# Patient Record
Sex: MALE | Race: WHITE | NOT HISPANIC OR LATINO | Employment: FULL TIME | URBAN - METROPOLITAN AREA
[De-identification: names, ages, dates, MRNs, and addresses within clinical notes are randomized per-mention and may not be internally consistent; named-entity substitution may affect disease eponyms.]

---

## 2023-01-14 ENCOUNTER — HOSPITAL ENCOUNTER (OUTPATIENT)
Facility: MEDICAL CENTER | Age: 38
End: 2023-01-15
Attending: EMERGENCY MEDICINE | Admitting: INTERNAL MEDICINE
Payer: OTHER MISCELLANEOUS

## 2023-01-14 ENCOUNTER — APPOINTMENT (OUTPATIENT)
Dept: RADIOLOGY | Facility: MEDICAL CENTER | Age: 38
End: 2023-01-14
Attending: EMERGENCY MEDICINE
Payer: OTHER MISCELLANEOUS

## 2023-01-14 DIAGNOSIS — R00.0 WIDE-COMPLEX TACHYCARDIA: ICD-10-CM

## 2023-01-14 DIAGNOSIS — I45.6 WPW (WOLFF-PARKINSON-WHITE SYNDROME): ICD-10-CM

## 2023-01-14 LAB
ALBUMIN SERPL BCP-MCNC: 3.8 G/DL (ref 3.2–4.9)
ALBUMIN/GLOB SERPL: 1.5 G/DL
ALP SERPL-CCNC: 44 U/L (ref 30–99)
ALT SERPL-CCNC: 17 U/L (ref 2–50)
AMPHET UR QL SCN: NEGATIVE
ANION GAP SERPL CALC-SCNC: 10 MMOL/L (ref 7–16)
AST SERPL-CCNC: 27 U/L (ref 12–45)
BARBITURATES UR QL SCN: NEGATIVE
BASOPHILS # BLD AUTO: 0.5 % (ref 0–1.8)
BASOPHILS # BLD: 0.05 K/UL (ref 0–0.12)
BENZODIAZ UR QL SCN: NEGATIVE
BILIRUB SERPL-MCNC: 0.2 MG/DL (ref 0.1–1.5)
BUN SERPL-MCNC: 17 MG/DL (ref 8–22)
BZE UR QL SCN: NEGATIVE
CALCIUM ALBUM COR SERPL-MCNC: 8.7 MG/DL (ref 8.5–10.5)
CALCIUM SERPL-MCNC: 8.5 MG/DL (ref 8.5–10.5)
CANNABINOIDS UR QL SCN: NEGATIVE
CHLORIDE SERPL-SCNC: 105 MMOL/L (ref 96–112)
CO2 SERPL-SCNC: 23 MMOL/L (ref 20–33)
CREAT SERPL-MCNC: 0.91 MG/DL (ref 0.5–1.4)
EKG IMPRESSION: NORMAL
EOSINOPHIL # BLD AUTO: 0.22 K/UL (ref 0–0.51)
EOSINOPHIL NFR BLD: 2.2 % (ref 0–6.9)
ERYTHROCYTE [DISTWIDTH] IN BLOOD BY AUTOMATED COUNT: 42.8 FL (ref 35.9–50)
ETHANOL BLD-MCNC: <10.1 MG/DL
GFR SERPLBLD CREATININE-BSD FMLA CKD-EPI: 112 ML/MIN/1.73 M 2
GLOBULIN SER CALC-MCNC: 2.5 G/DL (ref 1.9–3.5)
GLUCOSE SERPL-MCNC: 79 MG/DL (ref 65–99)
HCT VFR BLD AUTO: 45.5 % (ref 42–52)
HGB BLD-MCNC: 15.3 G/DL (ref 14–18)
IMM GRANULOCYTES # BLD AUTO: 0.03 K/UL (ref 0–0.11)
IMM GRANULOCYTES NFR BLD AUTO: 0.3 % (ref 0–0.9)
LYMPHOCYTES # BLD AUTO: 2.98 K/UL (ref 1–4.8)
LYMPHOCYTES NFR BLD: 29.5 % (ref 22–41)
MAGNESIUM SERPL-MCNC: 1.7 MG/DL (ref 1.5–2.5)
MCH RBC QN AUTO: 29.7 PG (ref 27–33)
MCHC RBC AUTO-ENTMCNC: 33.6 G/DL (ref 33.7–35.3)
MCV RBC AUTO: 88.2 FL (ref 81.4–97.8)
METHADONE UR QL SCN: NEGATIVE
MONOCYTES # BLD AUTO: 0.6 K/UL (ref 0–0.85)
MONOCYTES NFR BLD AUTO: 5.9 % (ref 0–13.4)
NEUTROPHILS # BLD AUTO: 6.21 K/UL (ref 1.82–7.42)
NEUTROPHILS NFR BLD: 61.6 % (ref 44–72)
NRBC # BLD AUTO: 0 K/UL
NRBC BLD-RTO: 0 /100 WBC
OPIATES UR QL SCN: NEGATIVE
OXYCODONE UR QL SCN: NEGATIVE
PCP UR QL SCN: NEGATIVE
PLATELET # BLD AUTO: 258 K/UL (ref 164–446)
PMV BLD AUTO: 10.7 FL (ref 9–12.9)
POTASSIUM SERPL-SCNC: 4.2 MMOL/L (ref 3.6–5.5)
PROPOXYPH UR QL SCN: NEGATIVE
PROT SERPL-MCNC: 6.3 G/DL (ref 6–8.2)
RBC # BLD AUTO: 5.16 M/UL (ref 4.7–6.1)
SODIUM SERPL-SCNC: 138 MMOL/L (ref 135–145)
TROPONIN T SERPL-MCNC: 6 NG/L (ref 6–19)
TSH SERPL DL<=0.005 MIU/L-ACNC: 2.5 UIU/ML (ref 0.38–5.33)
WBC # BLD AUTO: 10.1 K/UL (ref 4.8–10.8)

## 2023-01-14 PROCEDURE — 82077 ASSAY SPEC XCP UR&BREATH IA: CPT

## 2023-01-14 PROCEDURE — 96374 THER/PROPH/DIAG INJ IV PUSH: CPT

## 2023-01-14 PROCEDURE — 700111 HCHG RX REV CODE 636 W/ 250 OVERRIDE (IP)

## 2023-01-14 PROCEDURE — 92960 CARDIOVERSION ELECTRIC EXT: CPT

## 2023-01-14 PROCEDURE — G0378 HOSPITAL OBSERVATION PER HR: HCPCS

## 2023-01-14 PROCEDURE — 71045 X-RAY EXAM CHEST 1 VIEW: CPT

## 2023-01-14 PROCEDURE — 84443 ASSAY THYROID STIM HORMONE: CPT

## 2023-01-14 PROCEDURE — 83735 ASSAY OF MAGNESIUM: CPT

## 2023-01-14 PROCEDURE — 85025 COMPLETE CBC W/AUTO DIFF WBC: CPT

## 2023-01-14 PROCEDURE — 700105 HCHG RX REV CODE 258: Performed by: INTERNAL MEDICINE

## 2023-01-14 PROCEDURE — 80307 DRUG TEST PRSMV CHEM ANLYZR: CPT

## 2023-01-14 PROCEDURE — 36415 COLL VENOUS BLD VENIPUNCTURE: CPT

## 2023-01-14 PROCEDURE — 94760 N-INVAS EAR/PLS OXIMETRY 1: CPT

## 2023-01-14 PROCEDURE — 94799 UNLISTED PULMONARY SVC/PX: CPT

## 2023-01-14 PROCEDURE — 96375 TX/PRO/DX INJ NEW DRUG ADDON: CPT

## 2023-01-14 PROCEDURE — 99222 1ST HOSP IP/OBS MODERATE 55: CPT | Performed by: INTERNAL MEDICINE

## 2023-01-14 PROCEDURE — 93005 ELECTROCARDIOGRAM TRACING: CPT | Performed by: EMERGENCY MEDICINE

## 2023-01-14 PROCEDURE — 80053 COMPREHEN METABOLIC PANEL: CPT

## 2023-01-14 PROCEDURE — 99285 EMERGENCY DEPT VISIT HI MDM: CPT

## 2023-01-14 PROCEDURE — 84484 ASSAY OF TROPONIN QUANT: CPT

## 2023-01-14 RX ORDER — ADENOSINE 3 MG/ML
12 INJECTION, SOLUTION INTRAVENOUS ONCE
Status: COMPLETED | OUTPATIENT
Start: 2023-01-14 | End: 2023-01-14

## 2023-01-14 RX ORDER — POLYETHYLENE GLYCOL 3350 17 G/17G
1 POWDER, FOR SOLUTION ORAL
Status: DISCONTINUED | OUTPATIENT
Start: 2023-01-14 | End: 2023-01-15 | Stop reason: HOSPADM

## 2023-01-14 RX ORDER — PROPOFOL 10 MG/ML
100 INJECTION, EMULSION INTRAVENOUS ONCE
Status: COMPLETED | OUTPATIENT
Start: 2023-01-14 | End: 2023-01-14

## 2023-01-14 RX ORDER — ADENOSINE 3 MG/ML
INJECTION, SOLUTION INTRAVENOUS
Status: COMPLETED
Start: 2023-01-14 | End: 2023-01-14

## 2023-01-14 RX ORDER — SODIUM CHLORIDE, SODIUM LACTATE, POTASSIUM CHLORIDE, CALCIUM CHLORIDE 600; 310; 30; 20 MG/100ML; MG/100ML; MG/100ML; MG/100ML
INJECTION, SOLUTION INTRAVENOUS CONTINUOUS
Status: DISCONTINUED | OUTPATIENT
Start: 2023-01-14 | End: 2023-01-15 | Stop reason: HOSPADM

## 2023-01-14 RX ORDER — PROPOFOL 10 MG/ML
INJECTION, EMULSION INTRAVENOUS
Status: COMPLETED
Start: 2023-01-14 | End: 2023-01-14

## 2023-01-14 RX ORDER — ENOXAPARIN SODIUM 100 MG/ML
40 INJECTION SUBCUTANEOUS DAILY
Status: DISCONTINUED | OUTPATIENT
Start: 2023-01-15 | End: 2023-01-15 | Stop reason: HOSPADM

## 2023-01-14 RX ORDER — BISACODYL 10 MG
10 SUPPOSITORY, RECTAL RECTAL
Status: DISCONTINUED | OUTPATIENT
Start: 2023-01-14 | End: 2023-01-15 | Stop reason: HOSPADM

## 2023-01-14 RX ORDER — AMOXICILLIN 250 MG
2 CAPSULE ORAL 2 TIMES DAILY
Status: DISCONTINUED | OUTPATIENT
Start: 2023-01-15 | End: 2023-01-15 | Stop reason: HOSPADM

## 2023-01-14 RX ADMIN — PROPOFOL 100 MG: 10 INJECTION, EMULSION INTRAVENOUS at 19:26

## 2023-01-14 RX ADMIN — ADENOSINE 12 MG: 3 INJECTION INTRAVENOUS at 19:10

## 2023-01-14 RX ADMIN — ADENOSINE 12 MG: 3 INJECTION, SOLUTION INTRAVENOUS at 19:10

## 2023-01-14 RX ADMIN — SODIUM CHLORIDE, POTASSIUM CHLORIDE, SODIUM LACTATE AND CALCIUM CHLORIDE: 600; 310; 30; 20 INJECTION, SOLUTION INTRAVENOUS at 20:23

## 2023-01-14 ASSESSMENT — ENCOUNTER SYMPTOMS
DOUBLE VISION: 0
ORTHOPNEA: 0
FEVER: 0
CHILLS: 0
COUGH: 0
CLAUDICATION: 0
PALPITATIONS: 1
BACK PAIN: 0
MYALGIAS: 0
VOMITING: 0
NAUSEA: 0
BLURRED VISION: 0
SHORTNESS OF BREATH: 0
ABDOMINAL PAIN: 0

## 2023-01-14 ASSESSMENT — FIBROSIS 4 INDEX: FIB4 SCORE: 0.89

## 2023-01-14 ASSESSMENT — LIFESTYLE VARIABLES
TOTAL SCORE: 0
ALCOHOL_USE: NO
TOTAL SCORE: 0
TOTAL SCORE: 0
ON A TYPICAL DAY WHEN YOU DRINK ALCOHOL HOW MANY DRINKS DO YOU HAVE: 0
AVERAGE NUMBER OF DAYS PER WEEK YOU HAVE A DRINK CONTAINING ALCOHOL: 0
EVER HAD A DRINK FIRST THING IN THE MORNING TO STEADY YOUR NERVES TO GET RID OF A HANGOVER: NO
CONSUMPTION TOTAL: NEGATIVE
EVER FELT BAD OR GUILTY ABOUT YOUR DRINKING: NO
HAVE PEOPLE ANNOYED YOU BY CRITICIZING YOUR DRINKING: NO
HOW MANY TIMES IN THE PAST YEAR HAVE YOU HAD 5 OR MORE DRINKS IN A DAY: 0
HAVE YOU EVER FELT YOU SHOULD CUT DOWN ON YOUR DRINKING: NO

## 2023-01-15 ENCOUNTER — PHARMACY VISIT (OUTPATIENT)
Dept: PHARMACY | Facility: MEDICAL CENTER | Age: 38
End: 2023-01-15
Payer: COMMERCIAL

## 2023-01-15 ENCOUNTER — APPOINTMENT (OUTPATIENT)
Dept: CARDIOLOGY | Facility: MEDICAL CENTER | Age: 38
End: 2023-01-15
Attending: INTERNAL MEDICINE
Payer: OTHER MISCELLANEOUS

## 2023-01-15 VITALS
RESPIRATION RATE: 14 BRPM | DIASTOLIC BLOOD PRESSURE: 63 MMHG | SYSTOLIC BLOOD PRESSURE: 118 MMHG | BODY MASS INDEX: 24.55 KG/M2 | HEIGHT: 72 IN | TEMPERATURE: 98.4 F | OXYGEN SATURATION: 93 % | WEIGHT: 181.22 LBS | HEART RATE: 58 BPM

## 2023-01-15 PROBLEM — I48.91 ATRIAL FIBRILLATION (HCC): Status: ACTIVE | Noted: 2023-01-15

## 2023-01-15 LAB
ALBUMIN SERPL BCP-MCNC: 3.8 G/DL (ref 3.2–4.9)
ALBUMIN/GLOB SERPL: 1.9 G/DL
ALP SERPL-CCNC: 46 U/L (ref 30–99)
ALT SERPL-CCNC: 13 U/L (ref 2–50)
ANION GAP SERPL CALC-SCNC: 8 MMOL/L (ref 7–16)
AST SERPL-CCNC: 16 U/L (ref 12–45)
BASOPHILS # BLD AUTO: 0.6 % (ref 0–1.8)
BASOPHILS # BLD: 0.05 K/UL (ref 0–0.12)
BILIRUB SERPL-MCNC: 0.2 MG/DL (ref 0.1–1.5)
BUN SERPL-MCNC: 17 MG/DL (ref 8–22)
CALCIUM ALBUM COR SERPL-MCNC: 9 MG/DL (ref 8.5–10.5)
CALCIUM SERPL-MCNC: 8.8 MG/DL (ref 8.5–10.5)
CHLORIDE SERPL-SCNC: 108 MMOL/L (ref 96–112)
CO2 SERPL-SCNC: 25 MMOL/L (ref 20–33)
CREAT SERPL-MCNC: 0.86 MG/DL (ref 0.5–1.4)
EKG IMPRESSION: NORMAL
EKG IMPRESSION: NORMAL
EOSINOPHIL # BLD AUTO: 0.18 K/UL (ref 0–0.51)
EOSINOPHIL NFR BLD: 2.2 % (ref 0–6.9)
ERYTHROCYTE [DISTWIDTH] IN BLOOD BY AUTOMATED COUNT: 42.9 FL (ref 35.9–50)
GFR SERPLBLD CREATININE-BSD FMLA CKD-EPI: 116 ML/MIN/1.73 M 2
GLOBULIN SER CALC-MCNC: 2 G/DL (ref 1.9–3.5)
GLUCOSE SERPL-MCNC: 127 MG/DL (ref 65–99)
HCT VFR BLD AUTO: 45.7 % (ref 42–52)
HGB BLD-MCNC: 15.6 G/DL (ref 14–18)
IMM GRANULOCYTES # BLD AUTO: 0.05 K/UL (ref 0–0.11)
IMM GRANULOCYTES NFR BLD AUTO: 0.6 % (ref 0–0.9)
LV EJECT FRACT  99904: 50
LYMPHOCYTES # BLD AUTO: 2.46 K/UL (ref 1–4.8)
LYMPHOCYTES NFR BLD: 30.1 % (ref 22–41)
MCH RBC QN AUTO: 29.9 PG (ref 27–33)
MCHC RBC AUTO-ENTMCNC: 34.1 G/DL (ref 33.7–35.3)
MCV RBC AUTO: 87.7 FL (ref 81.4–97.8)
MONOCYTES # BLD AUTO: 0.44 K/UL (ref 0–0.85)
MONOCYTES NFR BLD AUTO: 5.4 % (ref 0–13.4)
NEUTROPHILS # BLD AUTO: 4.98 K/UL (ref 1.82–7.42)
NEUTROPHILS NFR BLD: 61.1 % (ref 44–72)
NRBC # BLD AUTO: 0 K/UL
NRBC BLD-RTO: 0 /100 WBC
PLATELET # BLD AUTO: 267 K/UL (ref 164–446)
PMV BLD AUTO: 10.5 FL (ref 9–12.9)
POTASSIUM SERPL-SCNC: 3.8 MMOL/L (ref 3.6–5.5)
PROT SERPL-MCNC: 5.8 G/DL (ref 6–8.2)
RBC # BLD AUTO: 5.21 M/UL (ref 4.7–6.1)
SODIUM SERPL-SCNC: 141 MMOL/L (ref 135–145)
WBC # BLD AUTO: 8.2 K/UL (ref 4.8–10.8)

## 2023-01-15 PROCEDURE — 93010 ELECTROCARDIOGRAM REPORT: CPT | Performed by: INTERNAL MEDICINE

## 2023-01-15 PROCEDURE — 99204 OFFICE O/P NEW MOD 45 MIN: CPT | Performed by: INTERNAL MEDICINE

## 2023-01-15 PROCEDURE — G0378 HOSPITAL OBSERVATION PER HR: HCPCS

## 2023-01-15 PROCEDURE — 93005 ELECTROCARDIOGRAM TRACING: CPT | Performed by: INTERNAL MEDICINE

## 2023-01-15 PROCEDURE — 99239 HOSP IP/OBS DSCHRG MGMT >30: CPT | Performed by: INTERNAL MEDICINE

## 2023-01-15 PROCEDURE — 93306 TTE W/DOPPLER COMPLETE: CPT | Mod: 26 | Performed by: INTERNAL MEDICINE

## 2023-01-15 PROCEDURE — 85025 COMPLETE CBC W/AUTO DIFF WBC: CPT

## 2023-01-15 PROCEDURE — 80053 COMPREHEN METABOLIC PANEL: CPT

## 2023-01-15 PROCEDURE — RXMED WILLOW AMBULATORY MEDICATION CHARGE: Performed by: NURSE PRACTITIONER

## 2023-01-15 PROCEDURE — 93306 TTE W/DOPPLER COMPLETE: CPT

## 2023-01-15 RX ORDER — FLECAINIDE ACETATE 50 MG/1
50 TABLET ORAL 2 TIMES DAILY
Qty: 60 TABLET | Refills: 2 | Status: SHIPPED | OUTPATIENT
Start: 2023-01-15

## 2023-01-15 RX ORDER — METOPROLOL SUCCINATE 25 MG/1
25 TABLET, EXTENDED RELEASE ORAL DAILY
Qty: 30 TABLET | Refills: 2 | Status: SHIPPED | OUTPATIENT
Start: 2023-01-15

## 2023-01-15 ASSESSMENT — PATIENT HEALTH QUESTIONNAIRE - PHQ9
2. FEELING DOWN, DEPRESSED, IRRITABLE, OR HOPELESS: NOT AT ALL
SUM OF ALL RESPONSES TO PHQ9 QUESTIONS 1 AND 2: 0
1. LITTLE INTEREST OR PLEASURE IN DOING THINGS: NOT AT ALL

## 2023-01-15 ASSESSMENT — ENCOUNTER SYMPTOMS
EYE DISCHARGE: 0
FEVER: 0
MYALGIAS: 0
NAUSEA: 0
WHEEZING: 0
EYE PAIN: 0
VOMITING: 0
PALPITATIONS: 1
CHILLS: 0
NERVOUS/ANXIOUS: 0
BRUISES/BLEEDS EASILY: 0
COUGH: 0
ABDOMINAL PAIN: 0

## 2023-01-15 NOTE — ED NOTES
Med rec updated and complete. Allergies reviewed. Confirmed name and date of birth.  Birth date 1985 not 1985. Pt  Denies taking medications. Pt will need to use [facility] ( meds to beds) .  Pt is not from the area.

## 2023-01-15 NOTE — DISCHARGE INSTRUCTIONS
Discharge Instructions    Discharged to home by car with relative. Discharged via wheelchair, hospital escort: Yes.  Special equipment needed: Not Applicable    Be sure to schedule a follow-up appointment with your primary care doctor or any specialists as instructed.     Discharge Plan:   Diet Plan: Discussed  Activity Level: Discussed  Confirmed Follow up Appointment: Patient to Call and Schedule Appointment  Confirmed Symptoms Management: Discussed  Medication Reconciliation Updated: Yes  Influenza Vaccine Indication: Patient Refuses    I understand that a diet low in cholesterol, fat, and sodium is recommended for good health. Unless I have been given specific instructions below for another diet, I accept this instruction as my diet prescription.   Other diet: Heart Healthy     Special Instructions: None    -Is this patient being discharged with medication to prevent blood clots?  No    Is patient discharged on Warfarin / Coumadin?   No

## 2023-01-15 NOTE — PROGRESS NOTES
Report received from MYA Wilson.  Assumed care of patient.  Assessment complete.  Plan of care gone over with the patient and all concerns addressed.  Patient sitting up in bed with his significant other at the bedside. .  Patient A & O x 4.  No apparent signs of distress.  Safety precautions in place.  Patient educated to call for assistance.  Hourly rounding in place.

## 2023-01-15 NOTE — ASSESSMENT & PLAN NOTE
Patient presented with wide complex tachycardia and received  adenosine in ER twice but no change so he was cardiverted in ER and is now on sinus rhythm  Cardiology Dr. Tillman has been consulted appreciate rec  Echo has been ordered   Monitor on tele

## 2023-01-15 NOTE — ED TRIAGE NOTES
Chief Complaint   Patient presents with    Tachycardia     PT reports he felt acutely light headed and felt his heart racing around 1830 today. Per EMS HR found to be elevates in 190s. PT has had SVT in past, no further medical follow up.        BIB EMS to [facility], pt on monitor, provided call bell and in gown, labs drawn and sent.    Medications given en route:    Adenosine 6mg and 12 mg, total of 18mg    BP (!) 144/87   Pulse (!) 212   Resp (!) 27   Ht 1.829 m (6')   Wt 76.2 kg (168 lb)   SpO2 97%   BMI 22.78 kg/m²

## 2023-01-15 NOTE — PROGRESS NOTES
Received pt from ED,on arrival pt awake,a&ox4,no c/o palpitation,monitor tech called and gave information,pt ambulatory,wife at bedside,tele in ED shows SR with HR89/mt,poc explained.

## 2023-01-15 NOTE — ED PROVIDER NOTES
"ED Provider Note    CHIEF COMPLAINT  Chief Complaint   Patient presents with    Tachycardia     PT reports he felt acutely light headed and felt his heart racing around 1830 today. Per EMS HR found to be elevates in 190s. PT has had SVT in past, no further medical follow up.         HPI    Primary care provider: No primary care provider on file.   History obtained from: Patient and EMS  History limited by: None     Anupam Portillo is a 37 y.o. male who presents to the ED by EMS and was seen on arrival due to his presentation.  Patient is visiting from [place] and had sudden onset of what he described as \"irregular and fast\" heartbeat about 30 minutes prior to arrival while he was resting in bed.  He reports feeling slightly lightheaded without syncope and also slightly short of breath.  He denies any significant pain.  He was noted to have heart rate up to 190-200 range by EMS and was given 6 mg followed by 12 mg of adenosine without change.  Patient denies recent fever.  He did have some sinus congestion around Katie time but no further congestion.  No cough/sore throat/nausea/vomiting/diarrhea/dysuria/rash/edema.  He reports that he has had similar episodes over the past few years approximately once per year lasting perhaps up to 2 minutes and resolves on its own.  He states that he has gone to the hospital afterwards but has always been found to be in normal sinus rhythm.  He does not believe he has been evaluated by cardiologist or has had a cardiac echo.  He states that he does not have any past medical problems and is not on any medications.  He did have some alcohol last night and reports that he drinks alcohol socially on the weekends but not heavily.  He denies drug use.    REVIEW OF SYSTEMS  Please see HPI for pertinent positives/negatives.  All other systems reviewed and are negative.     PAST MEDICAL HISTORY  No past medical history on file.     SURGICAL HISTORY  History reviewed. No pertinent surgical " history.     SOCIAL HISTORY  Social History     Tobacco Use    Smoking status: Never    Smokeless tobacco: Never   Vaping Use    Vaping Use: Never used   Substance and Sexual Activity    Alcohol use: Yes    Drug use: Never    Sexual activity: Not on file        FAMILY HISTORY  History reviewed. No pertinent family history.     CURRENT MEDICATIONS  Home Medications       Reviewed by Gretta Diaz (Pharmacy Tech) on 01/14/23 at 2000  Med List Status: Complete     Medication Last Dose Status        Patient Dre Taking any Medications                            ALLERGIES  No Known Allergies     PHYSICAL EXAM  VITAL SIGNS: /73   Pulse 85   Resp 17   Ht 1.829 m (6')   Wt 76.2 kg (168 lb)   SpO2 98%   BMI 22.78 kg/m²  @[id number]::@     Pulse ox interpretation: 97% I interpret this pulse ox as normal     Cardiac monitor interpretation: Wide-complex with heart rate in the 190s as interpreted by me.  The patient presented with palpitations and cardiac monitor was ordered to monitor for dysrhythmia.    Constitutional: Well developed, well nourished, alert in no apparent distress, nontoxic appearance    HENT: No external signs of trauma, normocephalic, oropharynx moist and clear, nose normal    Eyes: PERRL, conjunctiva without erythema, no discharge, no icterus    Neck: Soft and supple, trachea midline, no stridor, no tenderness, no LAD, no JVD, good ROM    Cardiovascular: Tachycardic and fairly regular, no murmurs/rubs/gallops, strong distal pulses and good perfusion    Thorax & Lungs: No respiratory distress, CTAB   Abdomen: Soft, nontender, nondistended, no guarding, no rebound, normal BS    Back: No CVAT    Extremities: No cyanosis, no edema, no gross deformity, good ROM, no tenderness, intact distal pulses with brisk cap refill    Skin: Warm, dry, no pallor/cyanosis, no rash noted    Lymphatic: No lymphadenopathy noted    Neuro: A/O times 3, no focal deficits noted    Psychiatric: Cooperative, normal mood  and affect, normal judgement, appropriate for clinical situation        DIAGNOSTIC STUDIES / PROCEDURES    Conscious Sedation Procedure    Indication: cardioversion    Consent: I have discussed with the patient and/or the patient representative the indication, alternatives, and the possible risks and/or complications of the planned procedure and the anesthesia methods. The patient and/or patient representative appear to understand and agree to proceed.    Physician Involvement: The attending physician was present and supervising this procedure.    Pre-Sedation Documentation and Exam: I have personally completed a history, physical exam & review of systems for this patient (see notes).  Airway Assessment: Mallampati Class II - (soft palate, fauces & uvula are visible)    Prior History of Anesthesia Complications: none    ASA Classification: Class 1 - A normal healthy patient    Sedation/ Anesthesia Plan: intravenous sedation    Medications Used: propofol intravenously    Monitoring and Safety: The patient was placed on a cardiac monitor and vital signs, pulse oximetry and level of consciousness were continuously evaluated throughout the procedure. The patient was closely monitored until recovery from the medications was complete and the patient had returned to baseline status. Respiratory therapy was on standby at all times during the procedure.    Post-Sedation Vital Signs: Vital signs were reviewed and were stable after the procedure (see flow sheet for vitals)            Post-Sedation Exam: Return to baseline           Complications: none      Cardioversion Procedure    Indication: Wide-complex tachycardia    Consent: The patient was counseled regarding the procedure, its indications, risks, potential complications and alternatives, and any questions were answered. Consent was obtained to proceed.    Pre-Medication: propofol 100 mg intravenously    Procedure: The patient was placed in the supine position and the  chest area was exposed. The cardioversion pads were applied in the standard manner and configuration.    Attempt #1: The defibrillator was set on the synchronous mode and charged to 200 joules.  A charge was then delivered which resulted in conversion to normal sinus rhythm.    The patient tolerated the procedure well.    Complications: None      EKG  12 Lead EKG obtained at 1906 and interpreted by me:   Rate: 187   Rhythm: Wide-complex tachycardia  Ectopy: PVCs and PACs    Clinical Impression: Patient appears to be in wide-complex tachycardia with PVCs and PACs    EKG  12 Lead EKG obtained at 1926 after cardioversion and interpreted by me:   Rate: 94   Rhythm: Sinus rhythm   Ectopy: None  Intervals: Normal   Axis: Normal   QRS: Normal  ST segments: Minimal ST depression precordial leads  T Waves: Normal    Clinical Impression: Sinus rhythm with nonspecific ST changes      LABS  All labs reviewed by me.     Results for orders placed or performed during the hospital encounter of 01/14/23   CBC WITH DIFFERENTIAL   Result Value Ref Range    WBC 10.1 4.8 - 10.8 K/uL    RBC 5.16 4.70 - 6.10 M/uL    Hemoglobin 15.3 14.0 - 18.0 g/dL    Hematocrit 45.5 42.0 - 52.0 %    MCV 88.2 81.4 - 97.8 fL    MCH 29.7 27.0 - 33.0 pg    MCHC 33.6 (L) 33.7 - 35.3 g/dL    RDW 42.8 35.9 - 50.0 fL    Platelet Count 258 164 - 446 K/uL    MPV 10.7 9.0 - 12.9 fL    Neutrophils-Polys 61.60 44.00 - 72.00 %    Lymphocytes 29.50 22.00 - 41.00 %    Monocytes 5.90 0.00 - 13.40 %    Eosinophils 2.20 0.00 - 6.90 %    Basophils 0.50 0.00 - 1.80 %    Immature Granulocytes 0.30 0.00 - 0.90 %    Nucleated RBC 0.00 /100 WBC    Neutrophils (Absolute) 6.21 1.82 - 7.42 K/uL    Lymphs (Absolute) 2.98 1.00 - 4.80 K/uL    Monos (Absolute) 0.60 0.00 - 0.85 K/uL    Eos (Absolute) 0.22 0.00 - 0.51 K/uL    Baso (Absolute) 0.05 0.00 - 0.12 K/uL    Immature Granulocytes (abs) 0.03 0.00 - 0.11 K/uL    NRBC (Absolute) 0.00 K/uL   COMP METABOLIC PANEL   Result Value Ref  Range    Sodium 138 135 - 145 mmol/L    Potassium 4.2 3.6 - 5.5 mmol/L    Chloride 105 96 - 112 mmol/L    Co2 23 20 - 33 mmol/L    Anion Gap 10.0 7.0 - 16.0    Glucose 79 65 - 99 mg/dL    Bun 17 8 - 22 mg/dL    Creatinine 0.91 0.50 - 1.40 mg/dL    Calcium 8.5 8.5 - 10.5 mg/dL    AST(SGOT) 27 12 - 45 U/L    ALT(SGPT) 17 2 - 50 U/L    Alkaline Phosphatase 44 30 - 99 U/L    Total Bilirubin 0.2 0.1 - 1.5 mg/dL    Albumin 3.8 3.2 - 4.9 g/dL    Total Protein 6.3 6.0 - 8.2 g/dL    Globulin 2.5 1.9 - 3.5 g/dL    A-G Ratio 1.5 g/dL   TROPONIN   Result Value Ref Range    Troponin T 6 6 - 19 ng/L   MAGNESIUM   Result Value Ref Range    Magnesium 1.7 1.5 - 2.5 mg/dL   TSH WITH REFLEX TO FT4   Result Value Ref Range    TSH 2.500 0.380 - 5.330 uIU/mL   DIAGNOSTIC ALCOHOL   Result Value Ref Range    Diagnostic Alcohol <10.1 <10.1 mg/dL   CORRECTED CALCIUM   Result Value Ref Range    Correct Calcium 8.7 8.5 - 10.5 mg/dL   ESTIMATED GFR   Result Value Ref Range    GFR (CKD-EPI) 112 >60 mL/min/1.73 m 2   EKG   Result Value Ref Range    Report       [facility]    Test Date:  2023  Pt Name:    RICHIE MONZON               Department: ER  MRN:        [id number]                      Room:        14  Gender:     Male                         Technician:  :        1985                   Requested By:ER TRIAGE PROTOCOL  Order #:    [id number]                    Reading MD:    Measurements  Intervals                                Axis  Rate:       187                          P:          -53  AK:         128                          QRS:        116  QRSD:       178                          T:          -53  QT:         316  QTc:        558    Interpretive Statements  Extreme tachycardia with wide complex, no further rhythm analysis attempted  No previous ECG available for comparison     EKG   Result Value Ref Range    Report       [facility]    Test Date:   2023  Pt Name:    RICHIE MONZON               Department: ER  MRN:        [id number]                      Room:        14  Gender:     Male                         Technician:  :        1985                   Requested By:JOVANNY NEAL  Order #:    [id number]                    Reading MD:    Measurements  Intervals                                Axis  Rate:       94                           P:          50  KY:         127                          QRS:        128  QRSD:       114                          T:          57  QT:         341  QTc:        427    Interpretive Statements  Sinus rhythm  Borderline intraventricular conduction delay  Probable right ventricular hypertrophy  Compared to ECG 2023 19:06:48  No significant changes          RADIOLOGY  I have independently interpreted the diagnostic imaging associated with this visit and am waiting the final reading from the radiologist.     DX-CHEST-PORTABLE (1 VIEW)   Final Result      No acute cardiopulmonary disease evident.      EC-ECHOCARDIOGRAM COMPLETE W/O CONT    (Results Pending)          COURSE & MEDICAL DECISION MAKING  Nursing notes, VS, PMSFHx reviewed in chart.     Review of past medical records shows the patient without prior visits to this ED.      Differential diagnoses considered include but are not limited to: Atrial fib/flutter, SVT, ventricular tachycardia, WPW, Brugada sydrome, prolonged QT syndrome, PAC, PVC, AMI, CHF, valvular heart disease, thyroid disorder, electrolyte abnormality       ED Observation Status? Yes; I am placing the patient in to an observation status due to a diagnostic uncertainty as well as therapeutic intensity. Patient placed in observation status at 7:19 PM, 2023.       INITIAL ASSESSMENT AND PLAN  Care Narrative: This is a 37-year-old otherwise healthy patient who presents by EMS due to sudden onset of fast irregular heartbeat approximately 30 minutes prior to arrival while at rest.  He is noted  to have wide-complex tachycardia but otherwise exam is benign.  No hypotension.  Patient is alert.  12 mg adenosine given in the ED without any change.  Discussed with patient cardioversion under procedural sedation and he is agreeable.      Discussion of management with other [facility] or appropriate source(s): Pharmacy regarding medication treatment and cardiologist        1955: D/W Dr. Tillman, cardiologist.  Patient can be admitted by hospitalist to be monitored overnight and to obtain echo.  They will consult on the patient    2005: D/W hospitalist who will admit patient      History and physical exam as above.  I was called to see the patient upon his arrival due to his tachycardia.  His heart rate was noted to be around 190-200 range and wide-complex.  He was not hypotensive or hypoxic and without mental status change or respiratory distress.  There was no change with 12 mg of adenosine in the ED.  I discussed with him risks/benefits/indications for cardioversion under sedation and he would like to proceed.  He converted to normal sinus rhythm after synchronized cardioversion as above without any complications and remained hemodynamically stable afterwards.  Cardiologist was consulted and thought that this is likely due to WPW.  Since patient is visiting from [place], we will admit for close observation overnight and obtain echo.  Cardiology will consult on the patient.  I discussed with the hospitalist who graciously agreed to admit patient.  Patient and wife updated on the findings and plan and they are agreeable.      CRITICAL CARE NOTE:  Total critical care time spent on this patient was 30 minutes exclusive of separately billable procedures.  This may include direct bedside patient care, speaking with family members, review of past medical records, reviewing the results of laboratory/diagnostic studies, consulting with other physicians, as well as evaluating the response to the therapy instituted.        FINAL  IMPRESSION  1. Wide-complex tachycardia Acute          DISPOSITION  Patient will be admitted by hospitalist in guarded condition      Electronically signed by: Jesse Polk D.O., 1/14/2023 7:13 PM      Portions of this record were made with voice recognition software.  Despite my review, spelling/grammar/context errors may still remain.  Interpretation of this chart should be taken in this context.

## 2023-01-15 NOTE — H&P
"Hospital Medicine History & Physical Note    Date of Service  1/14/2023    Primary Care Physician  No primary care provider on file.    Consultants  cardiology    Specialist Names: Dr. Tillman     Code Status  Full Code    Chief Complaint  Chief Complaint   Patient presents with    Tachycardia     PT reports he felt acutely light headed and felt his heart racing around 1830 today. Per EMS HR found to be elevates in 190s. PT has had SVT in past, no further medical follow up.        History of Presenting Illness  Anupam Portillo is a 37 y.o. male who presented 1/14/2023 with palpitation .    This is a 37 y.o. male who presents to the ED by EMS and was seen on arrival due to his presentation.  Patient is visiting from [place] and had sudden onset of what he described as \"irregular and fast\" heartbeat about 30 minutes prior to arrival while he was resting in bed.  He reports feeling slightly lightheaded without syncope and also slightly short of breath.  He denies any significant pain.  He was noted to have heart rate up to 190-200 range by EMS and was given 6 mg followed by 12 mg of adenosine without change. Then he was cardioverted in er   Patient denies recent fever.  He did have some sinus congestion around Blanchard time but no further congestion.  No cough/sore throat/nausea/vomiting/diarrhea/dysuria/rash/edema.  He reports that he has had similar episodes over the past few years approximately once per year lasting perhaps up to 2 minutes and resolves on its own.  He states that he has gone to the hospital afterwards but has always been found to be in normal sinus rhythm.  He does not believe he has been evaluated by cardiologist or has had a cardiac echo.  He states that he does not have any past medical problems and is not on any medications.  He did have some alcohol last night and reports that he drinks alcohol socially on the weekends but not heavily.    Cardiology  has been consulted and patient admitted for " furhter work up   I discussed the plan of care with patient, family, and ERP .    Review of Systems  Review of Systems   Constitutional:  Negative for chills and fever.   HENT:  Negative for ear pain and tinnitus.    Eyes:  Negative for blurred vision and double vision.   Respiratory:  Negative for cough and shortness of breath.    Cardiovascular:  Positive for palpitations. Negative for chest pain, orthopnea and claudication.   Gastrointestinal:  Negative for abdominal pain, nausea and vomiting.   Genitourinary:  Negative for dysuria, frequency and urgency.   Musculoskeletal:  Negative for back pain and myalgias.   All other systems reviewed and are negative.    Past Medical History   has no past medical history on file.    Surgical History   has no past surgical history on file.     Family History  family history is not on file.   Family history reviewed with patient. There is no family history that is pertinent to the chief complaint.     Social History   reports that he has never smoked. He has never used smokeless tobacco. He reports current alcohol use. He reports that he does not use drugs.    Allergies  No Known Allergies    Medications  None       Physical Exam  Pulse:  [] 85  Resp:  [6-27] 17  BP: (103-144)/(62-87) 118/73  SpO2:  [94 %-99 %] 98 %  Blood Pressure: 118/73       Pulse: 85   Respiration: 17   Pulse Oximetry: 98 %       Physical Exam  Vitals and nursing note reviewed.   HENT:      Mouth/Throat:      Pharynx: No oropharyngeal exudate or posterior oropharyngeal erythema.   Eyes:      General: No scleral icterus.        Right eye: No discharge.         Left eye: No discharge.   Cardiovascular:      Rate and Rhythm: Normal rate and regular rhythm.      Heart sounds: No murmur heard.    No gallop.   Abdominal:      General: There is no distension.      Tenderness: There is no abdominal tenderness. There is no guarding or rebound.   Skin:     General: Skin is warm.      Coloration: Skin is not  jaundiced or pale.   Neurological:      General: No focal deficit present.      Mental Status: He is alert and oriented to person, place, and time.       Laboratory:  Recent Labs     01/14/23 1908   WBC 10.1   RBC 5.16   HEMOGLOBIN 15.3   HEMATOCRIT 45.5   MCV 88.2   MCH 29.7   MCHC 33.6*   RDW 42.8   PLATELETCT 258   MPV 10.7     Recent Labs     01/14/23 1908   SODIUM 138   POTASSIUM 4.2   CHLORIDE 105   CO2 23   GLUCOSE 79   BUN 17   CREATININE 0.91   CALCIUM 8.5     Recent Labs     01/14/23 1908   ALTSGPT 17   ASTSGOT 27   ALKPHOSPHAT 44   TBILIRUBIN 0.2   GLUCOSE 79         No results for input(s): NTPROBNP in the last 72 hours.      Recent Labs     01/14/23 1908   TROPONINT 6       Imaging:  DX-CHEST-PORTABLE (1 VIEW)   Final Result      No acute cardiopulmonary disease evident.      EC-ECHOCARDIOGRAM COMPLETE W/O CONT    (Results Pending)         Assessment/Plan:  Justification for Admission Status  I anticipate this patient is appropriate for observation status at this time because tachycardia     Patient will need a Telemetry bed on CARDIOLOGY service .  The need is secondary to possible WPW.    * WPW (Antoinette-Parkinson-White syndrome)- (present on admission)  Assessment & Plan  Patient presented with wide complex tachycardia and received  adenosine in ER twice but no change so he was cardiverted in ER and is now on sinus rhythm  Cardiology Dr. Tillman has been consulted appreciate rec  Echo has been ordered   Monitor on tele         VTE prophylaxis: SCDs/TEDs and enoxaparin ppx

## 2023-01-15 NOTE — DISCHARGE SUMMARY
Discharge Summary    CHIEF COMPLAINT ON ADMISSION  Chief Complaint   Patient presents with    Tachycardia     PT reports he felt acutely light headed and felt his heart racing around 1830 today. Per EMS HR found to be elevates in 190s. PT has had SVT in past, no further medical follow up.        Reason for Admission  EMS     Admission Date  1/14/2023    CODE STATUS  Full Code    HPI & HOSPITAL COURSE  Anupam Portillo is a 37 y.o. male with a past medical history of palpitations who presented 1/14/2023 with rapid tachycardia both irregular and regular.  Consistent with atrial fibrillation with left-sided pathway.  Received adenosine without conversion, subsequent cardioversion.  No syncope or presyncope.  No chest pain or shortness of breath.  Lives in [facility].  At a convention.  No smoking.  Minimal alcohol use.   no children.  No illicit drug use.  History of palpitations and probable SVT since age 14.  Abrupt onset and offset.  Mostly regular.  Otherwise healthy.    Mr. Portillo has elected to have treatment at home in [facility] where he can be monitored by his primary care and find a local cardiologist.  He will be provided with flecainide 50 mg twice daily and metoprolol 25 mg daily.  He has been told that he will likely require ablation for long-term management.  He understands that he runs a greater risk of sudden cardiac death without ablation.       Therefore, he is discharged in good and stable condition to home with close outpatient follow-up.    The patient recovered much more quickly than anticipated on admission.    Discharge Date  1/15/23    FOLLOW UP ITEMS POST DISCHARGE  Follow-up with electro physiology for WPW    DISCHARGE DIAGNOSES  Principal Problem:    WPW (Antoinette-Parkinson-White syndrome) POA: Yes  Resolved Problems:    * No resolved hospital problems. *      FOLLOW UP  No future appointments.  No follow-up provider specified.    MEDICATIONS ON DISCHARGE     Medication List         START taking these medications        Instructions   flecainide 50 MG tablet  Commonly known as: TAMBOCOR   Take 1 Tablet by mouth 2 times a day.  Dose: 50 mg     metoprolol SR 25 MG Tb24  Commonly known as: TOPROL XL   Take 1 Tablet by mouth every day.  Dose: 25 mg              Allergies  No Known Allergies    DIET  Orders Placed This Encounter   Procedures    Diet Order Diet: Cardiac     Standing Status:   Standing     Number of Occurrences:   1     Order Specific Question:   Diet:     Answer:   Cardiac [6]       ACTIVITY  As tolerated.  Weight bearing as tolerated    CONSULTATIONS  Cardiology and electrocardiology    PROCEDURES  Cardioversion    LABORATORY  Lab Results   Component Value Date    SODIUM 141 01/15/2023    POTASSIUM 3.8 01/15/2023    CHLORIDE 108 01/15/2023    CO2 25 01/15/2023    GLUCOSE 127 (H) 01/15/2023    BUN 17 01/15/2023    CREATININE 0.86 01/15/2023        Lab Results   Component Value Date    WBC 8.2 01/15/2023    HEMOGLOBIN 15.6 01/15/2023    HEMATOCRIT 45.7 01/15/2023    PLATELETCT 267 01/15/2023        Total time of the discharge process was 31 minutes.

## 2023-01-15 NOTE — CONSULTS
Cardiology Initial Consultation    Date of Service  1/15/2023    Referring Physician  Nasrin Loomis M.D.    Reason for Consultation  Atrial fibrillation with WPW requiring cardioversion    History of Presenting Illness  Anupam Portillo is a 37 y.o. male with a past medical history of palpitations who presented 1/14/2023 with rapid tachycardia both irregular and regular.  Consistent with atrial fibrillation with left-sided pathway.  Received adenosine without conversion, subsequent cardioversion.  No syncope or presyncope.  No chest pain or shortness of breath.  Lives in [facility].  At a convention.  No smoking.  Minimal alcohol use.   no children.  No illicit drug use.  History of palpitations and probable SVT since age 14.  Abrupt onset and offset.  Mostly regular.  Otherwise healthy.    Review of Systems  Review of Systems   Constitutional:  Negative for chills and fever.   HENT:  Negative for congestion.    Eyes:  Negative for pain and discharge.   Respiratory:  Negative for cough and wheezing.    Cardiovascular:  Positive for palpitations. Negative for chest pain.   Gastrointestinal:  Negative for abdominal pain, nausea and vomiting.   Musculoskeletal:  Negative for myalgias.   Skin:  Negative for rash.   Hematological:  Does not bruise/bleed easily.   Psychiatric/Behavioral:  The patient is not nervous/anxious.    All other systems reviewed and are negative.    Past Medical History   has no past medical history on file.    Surgical History   has no past surgical history on file.    Family History  family history is not on file.    Social History   reports that he has never smoked. He has never used smokeless tobacco. He reports current alcohol use. He reports that he does not use drugs.    Medications  None       Allergies  No Known Allergies    Vital signs in last 24 hours  Temp:  [36.1 °C (97 °F)-37.2 °C (99 °F)] 36.1 °C (97 °F)  Pulse:  [] 72  Resp:  [6-27] 18  BP: (103-144)/(59-87)  114/60  SpO2:  [93 %-99 %] 95 %    Physical Exam  Physical Exam  Vitals reviewed.   Constitutional:       General: He is not in acute distress.     Appearance: He is well-developed. He is not diaphoretic.   Eyes:      Conjunctiva/sclera: Conjunctivae normal.   Neck:      Thyroid: No thyroid mass or thyromegaly.      Vascular: No JVD.      Trachea: No tracheal deviation.   Cardiovascular:      Rate and Rhythm: Normal rate and regular rhythm.      Heart sounds: No murmur heard.  Pulmonary:      Effort: Pulmonary effort is normal. No respiratory distress.      Breath sounds: Normal breath sounds.   Chest:      Chest wall: No tenderness.   Abdominal:      Palpations: Abdomen is soft.      Tenderness: There is no abdominal tenderness.   Musculoskeletal:         General: Normal range of motion.   Skin:     General: Skin is warm and dry.   Neurological:      Mental Status: He is alert and oriented to person, place, and time.      Motor: No tremor.   Psychiatric:         Behavior: Behavior normal.       Lab Review  Lab Results   Component Value Date/Time    WBC 8.2 01/15/2023 01:30 AM    RBC 5.21 01/15/2023 01:30 AM    HEMOGLOBIN 15.6 01/15/2023 01:30 AM    HEMATOCRIT 45.7 01/15/2023 01:30 AM    MCV 87.7 01/15/2023 01:30 AM    MCH 29.9 01/15/2023 01:30 AM    MCHC 34.1 01/15/2023 01:30 AM    MPV 10.5 01/15/2023 01:30 AM      Lab Results   Component Value Date/Time    SODIUM 141 01/15/2023 01:30 AM    POTASSIUM 3.8 01/15/2023 01:30 AM    CHLORIDE 108 01/15/2023 01:30 AM    CO2 25 01/15/2023 01:30 AM    GLUCOSE 127 (H) 01/15/2023 01:30 AM    BUN 17 01/15/2023 01:30 AM    CREATININE 0.86 01/15/2023 01:30 AM      Lab Results   Component Value Date/Time    ASTSGOT 16 01/15/2023 01:30 AM    ALTSGPT 13 01/15/2023 01:30 AM     Lab Results   Component Value Date/Time    TROPONINT 6 01/14/2023 07:08 PM       No results for input(s): NTPROBNP in the last 72 hours.    Cardiac Imaging and Procedures Review  EKG:  My personal  interpretation of the EKG dated 1/14/2023 at 1906 is atrial fibrillation with preexcitation consistent with left-sided pathway.  1926 sinus rhythm with slight preexcitation again left-sided    Echocardiogram: Pending      Imaging  Chest X-Ray: Unremarkable        Assessment/Plan  No new Assessment & Plan notes have been filed under this hospital service since the last note was generated.  Service: Cardiac Electrophysiology  1.  WPW with atrial fibrillation.  Previous history of SVT but not documented.  Recommendation echocardiogram.  Would recommend electrophysiology study and catheter ablation while here.  Patient will consider.  If decides to have this done back in [place] would send home with flecainide 50 twice daily and Toprol 25 mg daily.  Long-term would require ablation.    Thank you for allowing me to participate in the care of this patient.      Please contact me with any questions.    Matthieu Tillman M.D.   Cardiologist, [facility]  [phone]

## 2023-01-15 NOTE — PROGRESS NOTES
2 RN Skin Assessment Completed by , RN and , RN.     Head: WDL  Ears: WDL  Nose: WDL  Mouth: WDL  Neck: WDL  Breasts/Chest: WDL  Shoulder Blades: WDL  Spine: WDL  (R) Arm/Elbow/hand: WDL  (L) Arm/Elbow/hand: WDL  Abdomen:WDL  Groin: WDL  Sacrum/Coccyx/Buttocks: wnl  (R) Leg: WDL  (L) Leg: WDL  (R) Heel/Foot/Toe: wnl  (L) Heel/Foot/Toe:wnl

## 2023-01-15 NOTE — PROGRESS NOTES
Cardiology Progress Note:    Cameron Carter M.D.  Date & Time note created:    1/15/2023   10:40 AM     Referring MD:  Dr. Roberts    Patient ID:   Name:             Anupam Portillo     YOB: 1985  Age:                 37 y.o.  male   MRN:               [id number]                                                             Chief Complaint / Reason for consult:  SVT with PWP.    History of Present Illness:    This is a [age] years old man who came in with palpitation, found to be in atrial fibrillation with WPW requiring cardioversion.    Patient decided to go back to [place] for ablation procedure.    LVEF of 50% . No significant valvular disease. I have independently interpreted and reviewed echocardiogram's actual images.     Overnight events:  No acute events overnight.     Review of Systems:      Constitutional: Denies fevers, Denies weight changes  Eyes: Denies changes in vision, no eye pain  Ears/Nose/Throat/Mouth: Denies nasal congestion or sore throat   Cardiovascular: no chest pain, no palpitations   Respiratory: no shortness of breath , Denies cough  Gastrointestinal/Hepatic: Denies abdominal pain, nausea, vomiting, diarrhea, constipation or GI bleeding   Genitourinary: Denies dysuria or frequency  Musculoskeletal/Rheum: Denies  joint pain and swelling   Skin: Denies rash  Neurological: Denies headache, confusion, memory loss or focal weakness/parasthesias  Psychiatric: denies mood disorder   Endocrine: Nandini thyroid problems  Heme/Oncology/Lymph Nodes: Denies enlarged lymph nodes, denies brusing or known bleeding disorder  All other systems were reviewed and are negative (AMA/CMS criteria)                Past Medical History:   Past Medical History:   Diagnosis Date    Atrial fibrillation (HCC) 1/15/2023     Active Hospital Problems    Diagnosis     Atrial fibrillation (HCC) [I48.91]     WPW (Antoinette-Parkinson-White syndrome) [I45.6]        Past Surgical History:  History reviewed. No  pertinent surgical history.    Hospital Medications:    Current Facility-Administered Medications:     senna-docusate (PERICOLACE or SENOKOT S) 8.6-50 MG per tablet 2 Tablet, 2 Tablet, Oral, BID **AND** polyethylene glycol/lytes (MIRALAX) PACKET 1 Packet, 1 Packet, Oral, QDAY PRN **AND** magnesium hydroxide (MILK OF MAGNESIA) suspension 30 mL, 30 mL, Oral, QDAY PRN **AND** bisacodyl (DULCOLAX) suppository 10 mg, 10 mg, Rectal, QDAY PRN, Nasrin Loomis M.D.    lactated ringers infusion, , Intravenous, Continuous, Nasrin Loomis M.D., Continue to Floor at 01/14/23 2120    enoxaparin (Lovenox) inj 40 mg, 40 mg, Subcutaneous, DAILY AT 1800, Nasrin Loomis M.D.    Current Outpatient Medications:  No medications prior to admission.       Medication Allergy:  No Known Allergies    Family History:  History reviewed. No pertinent family history.    Social History:  Social History     Socioeconomic History    Marital status:      Spouse name: Not on file    Number of children: Not on file    Years of education: Not on file    Highest education level: Not on file   Occupational History    Not on file   Tobacco Use    Smoking status: Never    Smokeless tobacco: Never   Vaping Use    Vaping Use: Never used   Substance and Sexual Activity    Alcohol use: Yes    Drug use: Never    Sexual activity: Not on file   Other Topics Concern    Not on file   Social History Narrative    Not on file     Social Determinants of Health     Financial Resource Strain: Not on file   Food Insecurity: Not on file   Transportation Needs: Not on file   Physical Activity: Not on file   Stress: Not on file   Social Connections: Not on file   Intimate Partner Violence: Not on file   Housing Stability: Not on file         Physical Exam:  Vitals/ General Appearance:   Weight/BMI: Body mass index is 24.58 kg/m².  /65   Pulse (!) 52   Temp 36.4 °C (97.5 °F) (Temporal)   Resp 14   Ht 1.829 m (6')   Wt 82.2 kg (181 lb 3.5 oz)   SpO2 94%   Vitals:     23 2127 23 2336 01/15/23 0436 01/15/23 0726   BP: 124/77 115/59 114/60 115/65   Pulse: 83 94 72 (!) 52   Resp: 18 18 18 14   Temp: 37.2 °C (99 °F) 36.2 °C (97.1 °F) 36.1 °C (97 °F) 36.4 °C (97.5 °F)   TempSrc: Temporal Temporal Temporal Temporal   SpO2: 94% 93% 95% 94%   Weight: 82.2 kg (181 lb 3.5 oz)      Height: 1.829 m (6')        Oxygen Therapy:  Pulse Oximetry: 94 %, O2 (LPM): 0, O2 Delivery Device: None - Room Air    Constitutional:   Well developed, Well nourished, No acute distress  HENMT:  Normocephalic, Atraumatic, Oropharynx moist mucous membranes, No oral exudates, Nose normal.  No thyromegaly.  Eyes:  EOMI, Conjunctiva normal, No discharge.  Neck:  Normal range of motion, No cervical tenderness,  no JVD.  Cardiovascular:  Normal heart rate, Normal rhythm, No murmurs, No rubs, No gallops.   Extremitites with intact distal pulses, no cyanosis, or edema.  Lungs:  Normal breath sounds, breath sounds clear to auscultation bilaterally,  no rales, no rhonchi, no wheezing.   Abdomen: Bowel sounds normal, Soft, No tenderness, No guarding, No rebound, No masses, No hepatosplenomegaly.  Skin: Warm, Dry, No erythema, No rash, no induration.  Neurologic: Alert & oriented x 3, No focal deficits noted, cranial nerves II through X are intact.  Psychiatric: Affect normal, Judgment normal, Mood normal.      MDM (Data Review):     Records reviewed and summarized in current documentation    Lab Data Review:  Recent Results (from the past 24 hour(s))   EKG    Collection Time: 23  7:06 PM   Result Value Ref Range    Report       [facility] Emergency Dept.    Test Date:  2023  Pt Name:    RICHIE MONZON               Department: ER  MRN:        [id number]                      Room:        14  Gender:     Male                         Technician:  :        1985                   Requested By:ER TRIAGE PROTOCOL  Order #:    [id number]                    Reading  MD:    Measurements  Intervals                                Axis  Rate:       187                          P:          -53  UT:         128                          QRS:        116  QRSD:       178                          T:          -53  QT:         316  QTc:        558    Interpretive Statements  Extreme tachycardia with wide complex, no further rhythm analysis attempted  No previous ECG available for comparison     CBC WITH DIFFERENTIAL    Collection Time: 01/14/23  7:08 PM   Result Value Ref Range    WBC 10.1 4.8 - 10.8 K/uL    RBC 5.16 4.70 - 6.10 M/uL    Hemoglobin 15.3 14.0 - 18.0 g/dL    Hematocrit 45.5 42.0 - 52.0 %    MCV 88.2 81.4 - 97.8 fL    MCH 29.7 27.0 - 33.0 pg    MCHC 33.6 (L) 33.7 - 35.3 g/dL    RDW 42.8 35.9 - 50.0 fL    Platelet Count 258 164 - 446 K/uL    MPV 10.7 9.0 - 12.9 fL    Neutrophils-Polys 61.60 44.00 - 72.00 %    Lymphocytes 29.50 22.00 - 41.00 %    Monocytes 5.90 0.00 - 13.40 %    Eosinophils 2.20 0.00 - 6.90 %    Basophils 0.50 0.00 - 1.80 %    Immature Granulocytes 0.30 0.00 - 0.90 %    Nucleated RBC 0.00 /100 WBC    Neutrophils (Absolute) 6.21 1.82 - 7.42 K/uL    Lymphs (Absolute) 2.98 1.00 - 4.80 K/uL    Monos (Absolute) 0.60 0.00 - 0.85 K/uL    Eos (Absolute) 0.22 0.00 - 0.51 K/uL    Baso (Absolute) 0.05 0.00 - 0.12 K/uL    Immature Granulocytes (abs) 0.03 0.00 - 0.11 K/uL    NRBC (Absolute) 0.00 K/uL   COMP METABOLIC PANEL    Collection Time: 01/14/23  7:08 PM   Result Value Ref Range    Sodium 138 135 - 145 mmol/L    Potassium 4.2 3.6 - 5.5 mmol/L    Chloride 105 96 - 112 mmol/L    Co2 23 20 - 33 mmol/L    Anion Gap 10.0 7.0 - 16.0    Glucose 79 65 - 99 mg/dL    Bun 17 8 - 22 mg/dL    Creatinine 0.91 0.50 - 1.40 mg/dL    Calcium 8.5 8.5 - 10.5 mg/dL    AST(SGOT) 27 12 - 45 U/L    ALT(SGPT) 17 2 - 50 U/L    Alkaline Phosphatase 44 30 - 99 U/L    Total Bilirubin 0.2 0.1 - 1.5 mg/dL    Albumin 3.8 3.2 - 4.9 g/dL    Total Protein 6.3 6.0 - 8.2 g/dL    Globulin 2.5 1.9 - 3.5  g/dL    A-G Ratio 1.5 g/dL   TROPONIN    Collection Time: 23  7:08 PM   Result Value Ref Range    Troponin T 6 6 - 19 ng/L   MAGNESIUM    Collection Time: 23  7:08 PM   Result Value Ref Range    Magnesium 1.7 1.5 - 2.5 mg/dL   TSH WITH REFLEX TO FT4    Collection Time: 23  7:08 PM   Result Value Ref Range    TSH 2.500 0.380 - 5.330 uIU/mL   DIAGNOSTIC ALCOHOL    Collection Time: 23  7:08 PM   Result Value Ref Range    Diagnostic Alcohol <10.1 <10.1 mg/dL   CORRECTED CALCIUM    Collection Time: 23  7:08 PM   Result Value Ref Range    Correct Calcium 8.7 8.5 - 10.5 mg/dL   ESTIMATED GFR    Collection Time: 23  7:08 PM   Result Value Ref Range    GFR (CKD-EPI) 112 >60 mL/min/1.73 m 2   EKG    Collection Time: 23  7:26 PM   Result Value Ref Range    Report       [facility] Emergency Dept.    Test Date:  2023  Pt Name:    RICHIE MONZON               Department: ER  MRN:        [id number]                      Room:       [facility]  Gender:     M                            Technician:  :        1985                   Requested By:JOVANNY NEAL  Order #:    [id number]                    Reading MD: Jovanny Neal    Measurements  Intervals                                Axis  Rate:       94                           P:          50  AZ:         127                          QRS:        128  QRSD:       114                          T:          57  QT:         341  QTc:        427    Interpretive Statements  Sinus rhythm  Borderline intraventricular conduction delay  Probable right ventricular hypertrophy  Compared to ECG 2023 19:06:48  No significant changes  Electronically Signed On 1- 6:32:39 PST by Jovanny Neal     URINE DRUG SCREEN    Collection Time: 23  8:42 PM   Result Value Ref Range    Amphetamines Urine Negative Negative    Barbiturates Negative Negative    Benzodiazepines Negative Negative    Cocaine Metabolite Negative Negative     Methadone Negative Negative    Opiates Negative Negative    Oxycodone Negative Negative    Phencyclidine -Pcp Negative Negative    Propoxyphene Negative Negative    Cannabinoid Metab Negative Negative   CBC with Differential    Collection Time: 01/15/23  1:30 AM   Result Value Ref Range    WBC 8.2 4.8 - 10.8 K/uL    RBC 5.21 4.70 - 6.10 M/uL    Hemoglobin 15.6 14.0 - 18.0 g/dL    Hematocrit 45.7 42.0 - 52.0 %    MCV 87.7 81.4 - 97.8 fL    MCH 29.9 27.0 - 33.0 pg    MCHC 34.1 33.7 - 35.3 g/dL    RDW 42.9 35.9 - 50.0 fL    Platelet Count 267 164 - 446 K/uL    MPV 10.5 9.0 - 12.9 fL    Neutrophils-Polys 61.10 44.00 - 72.00 %    Lymphocytes 30.10 22.00 - 41.00 %    Monocytes 5.40 0.00 - 13.40 %    Eosinophils 2.20 0.00 - 6.90 %    Basophils 0.60 0.00 - 1.80 %    Immature Granulocytes 0.60 0.00 - 0.90 %    Nucleated RBC 0.00 /100 WBC    Neutrophils (Absolute) 4.98 1.82 - 7.42 K/uL    Lymphs (Absolute) 2.46 1.00 - 4.80 K/uL    Monos (Absolute) 0.44 0.00 - 0.85 K/uL    Eos (Absolute) 0.18 0.00 - 0.51 K/uL    Baso (Absolute) 0.05 0.00 - 0.12 K/uL    Immature Granulocytes (abs) 0.05 0.00 - 0.11 K/uL    NRBC (Absolute) 0.00 K/uL   Comp Metabolic Panel (CMP)    Collection Time: 01/15/23  1:30 AM   Result Value Ref Range    Sodium 141 135 - 145 mmol/L    Potassium 3.8 3.6 - 5.5 mmol/L    Chloride 108 96 - 112 mmol/L    Co2 25 20 - 33 mmol/L    Anion Gap 8.0 7.0 - 16.0    Glucose 127 (H) 65 - 99 mg/dL    Bun 17 8 - 22 mg/dL    Creatinine 0.86 0.50 - 1.40 mg/dL    Calcium 8.8 8.5 - 10.5 mg/dL    AST(SGOT) 16 12 - 45 U/L    ALT(SGPT) 13 2 - 50 U/L    Alkaline Phosphatase 46 30 - 99 U/L    Total Bilirubin 0.2 0.1 - 1.5 mg/dL    Albumin 3.8 3.2 - 4.9 g/dL    Total Protein 5.8 (L) 6.0 - 8.2 g/dL    Globulin 2.0 1.9 - 3.5 g/dL    A-G Ratio 1.9 g/dL   CORRECTED CALCIUM    Collection Time: 01/15/23  1:30 AM   Result Value Ref Range    Correct Calcium 9.0 8.5 - 10.5 mg/dL   ESTIMATED GFR    Collection Time: 01/15/23  1:30 AM   Result  Value Ref Range    GFR (CKD-EPI) 116 >60 mL/min/1.73 m 2   EC-ECHOCARDIOGRAM COMPLETE W/O CONT    Collection Time: 01/15/23  7:42 AM   Result Value Ref Range    Left Ventrical Ejection Fraction 50    EKG    Collection Time: 01/15/23  8:24 AM   Result Value Ref Range    Report       [facility]    Test Date:  2023-01-15  Pt Name:    RICHIE MONZON               Department: CPU  MRN:        [id number]                      Room:       T218  Gender:     Male                         Technician: CORINNE  :        1985                   Requested By:SOFI FRANK  Order #:    [id number]                    Reading MD:    Measurements  Intervals                                Axis  Rate:       49                           P:          28  IA:         125                          QRS:        112  QRSD:       127                          T:          64  QT:         469  QTc:        424    Interpretive Statements  Sinus bradycardia  Vent pre-excitat'n(WPW), left acces'y pathway  Compared to ECG 2023 19:26:50  Sinus rhythm no longer present         Imaging/Procedures Review:    Chest Xray:  Reviewed    EKG:   As in HPI.     MDM (Assessment and Plan):     Active Hospital Problems    Diagnosis     Atrial fibrillation (HCC) [I48.91]     WPW (Antoinette-Parkinson-White syndrome) [I45.6]          Again, I spent a significant amount of time talking to patient and his left one at the bedside about potential ablation procedure here versus [place].  At this time, patient decided to go back to [place].  Therefore, we will prescribe flecainide along with Metroprolol therapy.  Risk and benefits of decision were stressed and discussed with patient.  EP did not recommend anticoagulation therapy.        Cameron Carter MD.   Cardiology Inpatient Service.  [facility].  [phone].  [place].

## 2023-01-15 NOTE — CARE PLAN
The patient is Watcher - Medium risk of patient condition declining or worsening         Progress made toward(s) clinical / shift goals:   discharge  Patient is not progressing towards the following goals:      Problem: Knowledge Deficit - Standard  Goal: Patient and family/care givers will demonstrate understanding of plan of care, disease process/condition, diagnostic tests and medications  Outcome: Progressing

## 2023-04-17 LAB — LV EJECT FRACT  99904: 50
